# Patient Record
Sex: FEMALE | Race: WHITE | NOT HISPANIC OR LATINO | Employment: FULL TIME | URBAN - METROPOLITAN AREA
[De-identification: names, ages, dates, MRNs, and addresses within clinical notes are randomized per-mention and may not be internally consistent; named-entity substitution may affect disease eponyms.]

---

## 2021-05-26 ENCOUNTER — RECORDS - HEALTHEAST (OUTPATIENT)
Dept: ADMINISTRATIVE | Facility: CLINIC | Age: 31
End: 2021-05-26

## 2021-05-29 ENCOUNTER — RECORDS - HEALTHEAST (OUTPATIENT)
Dept: ADMINISTRATIVE | Facility: CLINIC | Age: 31
End: 2021-05-29

## 2021-05-31 ENCOUNTER — RECORDS - HEALTHEAST (OUTPATIENT)
Dept: ADMINISTRATIVE | Facility: CLINIC | Age: 31
End: 2021-05-31

## 2021-06-01 ENCOUNTER — RECORDS - HEALTHEAST (OUTPATIENT)
Dept: ADMINISTRATIVE | Facility: CLINIC | Age: 31
End: 2021-06-01

## 2021-06-02 ENCOUNTER — RECORDS - HEALTHEAST (OUTPATIENT)
Dept: ADMINISTRATIVE | Facility: CLINIC | Age: 31
End: 2021-06-02

## 2023-04-11 ENCOUNTER — APPOINTMENT (OUTPATIENT)
Dept: CT IMAGING | Facility: CLINIC | Age: 33
End: 2023-04-11
Attending: NURSE PRACTITIONER
Payer: COMMERCIAL

## 2023-04-11 ENCOUNTER — HOSPITAL ENCOUNTER (EMERGENCY)
Facility: CLINIC | Age: 33
Discharge: HOME | End: 2023-04-11
Admitting: NURSE PRACTITIONER
Payer: COMMERCIAL

## 2023-04-11 VITALS
OXYGEN SATURATION: 97 % | HEIGHT: 64 IN | BODY MASS INDEX: 27.31 KG/M2 | SYSTOLIC BLOOD PRESSURE: 136 MMHG | DIASTOLIC BLOOD PRESSURE: 93 MMHG | HEART RATE: 92 BPM | TEMPERATURE: 98.4 F | RESPIRATION RATE: 20 BRPM | WEIGHT: 160 LBS

## 2023-04-11 DIAGNOSIS — G44.209 TENSION HEADACHE: ICD-10-CM

## 2023-04-11 DIAGNOSIS — R10.12 ABDOMINAL PAIN, LEFT UPPER QUADRANT: ICD-10-CM

## 2023-04-11 LAB
ALBUMIN SERPL-MCNC: 3.3 G/DL (ref 3.5–5)
ALBUMIN UR-MCNC: NEGATIVE MG/DL
ALP SERPL-CCNC: 62 U/L (ref 45–120)
ALT SERPL W P-5'-P-CCNC: 13 U/L (ref 0–45)
ANION GAP SERPL CALCULATED.3IONS-SCNC: 8 MMOL/L (ref 5–18)
APPEARANCE UR: CLEAR
AST SERPL W P-5'-P-CCNC: 12 U/L (ref 0–40)
BASOPHILS # BLD AUTO: 0.1 10E3/UL (ref 0–0.2)
BASOPHILS NFR BLD AUTO: 1 %
BILIRUB DIRECT SERPL-MCNC: 0.2 MG/DL
BILIRUB SERPL-MCNC: 0.7 MG/DL (ref 0–1)
BILIRUB UR QL STRIP: NEGATIVE
BUN SERPL-MCNC: 7 MG/DL (ref 8–22)
CALCIUM SERPL-MCNC: 8.7 MG/DL (ref 8.5–10.5)
CHLORIDE BLD-SCNC: 105 MMOL/L (ref 98–107)
CO2 SERPL-SCNC: 24 MMOL/L (ref 22–31)
COLOR UR AUTO: ABNORMAL
CREAT SERPL-MCNC: 0.71 MG/DL (ref 0.6–1.1)
EOSINOPHIL # BLD AUTO: 0.1 10E3/UL (ref 0–0.7)
EOSINOPHIL NFR BLD AUTO: 1 %
ERYTHROCYTE [DISTWIDTH] IN BLOOD BY AUTOMATED COUNT: 11.3 % (ref 10–15)
GFR SERPL CREATININE-BSD FRML MDRD: >90 ML/MIN/1.73M2
GLUCOSE BLD-MCNC: 91 MG/DL (ref 70–125)
GLUCOSE UR STRIP-MCNC: NEGATIVE MG/DL
HCG SERPL QL: NEGATIVE
HCT VFR BLD AUTO: 37.8 % (ref 35–47)
HGB BLD-MCNC: 12.9 G/DL (ref 11.7–15.7)
HGB UR QL STRIP: NEGATIVE
IMM GRANULOCYTES # BLD: 0 10E3/UL
IMM GRANULOCYTES NFR BLD: 0 %
KETONES UR STRIP-MCNC: NEGATIVE MG/DL
LEUKOCYTE ESTERASE UR QL STRIP: NEGATIVE
LIPASE SERPL-CCNC: 10 U/L (ref 0–52)
LYMPHOCYTES # BLD AUTO: 1.6 10E3/UL (ref 0.8–5.3)
LYMPHOCYTES NFR BLD AUTO: 24 %
MCH RBC QN AUTO: 32.7 PG (ref 26.5–33)
MCHC RBC AUTO-ENTMCNC: 34.1 G/DL (ref 31.5–36.5)
MCV RBC AUTO: 96 FL (ref 78–100)
MONOCYTES # BLD AUTO: 0.7 10E3/UL (ref 0–1.3)
MONOCYTES NFR BLD AUTO: 10 %
MUCOUS THREADS #/AREA URNS LPF: PRESENT /LPF
NEUTROPHILS # BLD AUTO: 4.4 10E3/UL (ref 1.6–8.3)
NEUTROPHILS NFR BLD AUTO: 64 %
NITRATE UR QL: NEGATIVE
NRBC # BLD AUTO: 0 10E3/UL
NRBC BLD AUTO-RTO: 0 /100
PH UR STRIP: 6.5 [PH] (ref 5–7)
PLATELET # BLD AUTO: 414 10E3/UL (ref 150–450)
POTASSIUM BLD-SCNC: 3.8 MMOL/L (ref 3.5–5)
PROT SERPL-MCNC: 7 G/DL (ref 6–8)
RBC # BLD AUTO: 3.94 10E6/UL (ref 3.8–5.2)
RBC URINE: 0 /HPF
SODIUM SERPL-SCNC: 137 MMOL/L (ref 136–145)
SP GR UR STRIP: 1.01 (ref 1–1.03)
SQUAMOUS EPITHELIAL: 2 /HPF
UROBILINOGEN UR STRIP-MCNC: 2 MG/DL
WBC # BLD AUTO: 7 10E3/UL (ref 4–11)
WBC URINE: 1 /HPF

## 2023-04-11 PROCEDURE — 250N000011 HC RX IP 250 OP 636: Performed by: NURSE PRACTITIONER

## 2023-04-11 PROCEDURE — 80053 COMPREHEN METABOLIC PANEL: CPT | Performed by: EMERGENCY MEDICINE

## 2023-04-11 PROCEDURE — 83690 ASSAY OF LIPASE: CPT | Performed by: EMERGENCY MEDICINE

## 2023-04-11 PROCEDURE — 85004 AUTOMATED DIFF WBC COUNT: CPT | Performed by: EMERGENCY MEDICINE

## 2023-04-11 PROCEDURE — 81001 URINALYSIS AUTO W/SCOPE: CPT | Performed by: EMERGENCY MEDICINE

## 2023-04-11 PROCEDURE — 74177 CT ABD & PELVIS W/CONTRAST: CPT

## 2023-04-11 PROCEDURE — 82248 BILIRUBIN DIRECT: CPT | Performed by: EMERGENCY MEDICINE

## 2023-04-11 PROCEDURE — 36415 COLL VENOUS BLD VENIPUNCTURE: CPT | Performed by: EMERGENCY MEDICINE

## 2023-04-11 PROCEDURE — 84703 CHORIONIC GONADOTROPIN ASSAY: CPT | Performed by: EMERGENCY MEDICINE

## 2023-04-11 PROCEDURE — 99285 EMERGENCY DEPT VISIT HI MDM: CPT | Mod: 25

## 2023-04-11 RX ORDER — OXYCODONE HYDROCHLORIDE 5 MG/1
5 TABLET ORAL EVERY 6 HOURS PRN
Qty: 6 TABLET | Refills: 0 | Status: SHIPPED | OUTPATIENT
Start: 2023-04-11 | End: 2023-04-14

## 2023-04-11 RX ORDER — CYCLOBENZAPRINE HCL 10 MG
10 TABLET ORAL 3 TIMES DAILY PRN
Qty: 20 TABLET | Refills: 0 | Status: SHIPPED | OUTPATIENT
Start: 2023-04-11 | End: 2023-04-17

## 2023-04-11 RX ORDER — IOPAMIDOL 755 MG/ML
100 INJECTION, SOLUTION INTRAVASCULAR ONCE
Status: COMPLETED | OUTPATIENT
Start: 2023-04-11 | End: 2023-04-11

## 2023-04-11 RX ADMIN — IOPAMIDOL 100 ML: 755 INJECTION, SOLUTION INTRAVENOUS at 17:29

## 2023-04-11 ASSESSMENT — ENCOUNTER SYMPTOMS
CHILLS: 1
NAUSEA: 1
CONSTIPATION: 1
COUGH: 0
ABDOMINAL PAIN: 1
DIARRHEA: 0
FEVER: 1
SORE THROAT: 1
ROS GI COMMENTS: POSITIVE FOR ABDOMINAL BLOATING
HEADACHES: 1
VOMITING: 0

## 2023-04-11 NOTE — ED TRIAGE NOTES
Pt here with left abdominal pain that started suddenly while at work. Was seen at  a few weeks and dx with an ulcer which those symptoms have resolved. Also had some left shoulder pain today but that has since resolved as well.      Triage Assessment     Row Name 04/11/23 6856       Triage Assessment (Adult)    Airway WDL WDL       Respiratory WDL    Respiratory WDL WDL       Skin Circulation/Temperature WDL    Skin Circulation/Temperature WDL WDL       Cardiac WDL    Cardiac WDL WDL       Peripheral/Neurovascular WDL    Peripheral Neurovascular WDL WDL       Cognitive/Neuro/Behavioral WDL    Cognitive/Neuro/Behavioral WDL WDL

## 2023-04-11 NOTE — ED PROVIDER NOTES
EMERGENCY DEPARTMENT ENCOUNTER      NAME: Katharine Orellana  AGE: 32 year old female  YOB: 1990  MRN: [id number]  EVALUATION DATE & TIME: No admission date for patient encounter.    PCP: No Ref-Primary, Physician    ED PROVIDER: ELIECER Drummond CNP      Chief Complaint   Patient presents with     Abdominal Pain         FINAL IMPRESSION:  1. Abdominal pain, left upper quadrant    2. Tension headache          ED COURSE & MEDICAL DECISION MAKIN:04 PM I met with the patient, obtained history, performed an initial exam, and discussed options and plan for treatment here in the ED. PPE: procedure mask  6:25 PM updated with results.    Pertinent Labs & Imaging studies reviewed. (See chart for details)  32 year old female presents to the Emergency Department for evaluation of LUQ abdominal pain. Recently evaluated at the urgency room and felt to have stomach ulcers. Has been taking NSAIDs for tension headaches. Has finished a course of omeprazole and has noted increased bloating along with pain. Today, pain radiated to the left shoulder. Ddx include but not limited to ectopic pregnancy, kidney stone, diverticulitis, colitis, PUD, gastritis, pancreatitis, pneumonia. Labs today including lipase, CMP, CBC and pregnancy test essentially unremarkable. UA not consistent with UTI. CT abdomen and pelvis does not show any etiology for LUQ pain.  Atelectasis noted in the left lower lobe but this does not seem to be the etiology for her symptoms that we discussed this finding.  Plan is to resume omeprazole, 20 mg daily.  Discontinue any NSAIDs.  We will trial Flexeril for her tension headaches.  Recommend Tylenol for pain.  Does not currently have established primary care so given referral to primary care for ongoing evaluation.  Additionally, given return precautions      Medical Decision Making    History:    Supplemental history from: Documented in chart, if applicable    External Record(s) reviewed:  Documented in chart, if applicable.    Work Up:    Chart documentation includes differential considered and any EKGs or imaging independently interpreted by provider, where specified.    In additional to work up documented, I considered the following work up: Documented in chart, if applicable.    External consultation:    Discussion of management with another provider: Documented in chart, if applicable    Complicating factors:    Care impacted by chronic illness: Other: kideny stones, migraine headaches    Care affected by social determinants of health: N/A    Disposition considerations: Discharge. I prescribed additional prescription strength medication(s) as charted. See documentation for any additional details.        At the conclusion of the encounter I discussed the results of all of the tests and the disposition. The questions were answered. The patient or family acknowledged understanding and was agreeable with the care plan.       0 minutes of critical care time     MEDICATIONS GIVEN IN THE EMERGENCY:  Medications   iopamidol (ISOVUE-370) solution 100 mL (100 mLs Intravenous $Given 4/11/23 3475)       NEW PRESCRIPTIONS STARTED AT TODAY'S ER VISIT  New Prescriptions    CYCLOBENZAPRINE (FLEXERIL) 10 MG TABLET    Take 1 tablet (10 mg) by mouth 3 times daily as needed (tension headache)    OMEPRAZOLE (PRILOSEC) 20 MG DR CAPSULE    Take 1 capsule (20 mg) by mouth daily for 30 days    OXYCODONE (ROXICODONE) 5 MG TABLET    Take 1 tablet (5 mg) by mouth every 6 hours as needed for severe pain            =================================================================    Patient information was obtained from: Patient      Use of Intrepreter: N/A     Limitations to History: None     HPI    Katharine Orellana is a 32 year old female with a history of kidney stones, migraine headache, who presents to the ED by walk in for evaluation of left sided abdominal pain.     Chart Review:  3/13/2023 ([facility]  [place]): Patient seen in ED for evaluation of intermittent epigastric abdominal pain. Workup was benign other than a mildly elevated WBC of 11.2 and bilirubin of 2.1. No US evidence of acute processes of gallbladder. Patient discharged with prescriptions for pantoprazole and pepcid with instructions to follow up outpatient.     Patient reports left sided abdominal pain with referred pain in her left shoulder. Patient's pain worsens with deep breathing, laying down, and with eating/drinking. Patient reports recently being seen in urgent care for similar abdominal pain and states she was prescribed omeprazole and a medication for her stomach lining. Patient describes several recent episodes of illness/symptoms and is not sure if they are connected to each other. Prior to her visit to [facility], patient states she had cold symptoms with fever and chills. Patient was then seen and told she may have an ulcer. After, patient notes she developed an intractable headache and neck ache. Patient then developed abdominal bloating as well as a rash on her left forearm. She additionally notes current nausea, constipation, fatigue, sore throat, and generalized arthralgias. Patient describes her headache as a tension headache that feels as though there is a band around her forehead to the back of her head. Patient states she has taken multiple home COVID-19 tests with negative results. She has denies relief from GasX, Tums, excedrin, ibuprofen, or oxycodone (leftover from previous kidney stone surgery). She got mild relief form Aleeve. Patient reports a history of kidney stones x3 and states her current pain does not feel like kidney stones. She denies vomiting, diarrhea, cough, or any other complaints at this time.    SHx: Patient reports drinking 1 glass of wine/week      Review of Systems   Constitutional: Positive for chills (resolved), fever (resolved) and malaise/fatigue.   HENT: Positive for sore throat.   "  Respiratory: Negative for cough.    Gastrointestinal: Positive for abdominal pain (  left sided, referred pain to left shoulder), constipation and nausea. Negative for diarrhea and vomiting.        Positive for abdominal bloating   Musculoskeletal: Positive for joint pain (generalized and referred pain to left shoulder).   Skin: Positive for rash (left forearm).   Neurological: Positive for headaches (band like).       PAST MEDICAL HISTORY:  Past Medical History:   Diagnosis Date     Genital lesion, female 10/14/2015    Formatting of this note might be different from the original. Small lesion, appears to be genital wart     Kidney stones 6/25/2009    Formatting of this note might be different from the original. Has history of 15 stones left kidney and now 9 residual on the right Sees a urologist     LGSIL of cervix of undetermined significance 9/9/2014    Formatting of this note might be different from the original. 09/09/2014 LSIL 02/02/2015 [place] Focal squamous atypia suggestive of, but not definitively diagnostic for TYRONE I 09/08/2016 NIL/HPV+ 07/10/2019 NIL/HPV+  Plan: Colposcopy     Major depressive disorder, recurrent episode, moderate (H) 1/5/2011     Migraine headache 12/22/2009     Psoriasis 6/25/2009     Tobacco use disorder 12/22/2009       PAST SURGICAL HISTORY:  History reviewed. No pertinent surgical history.        CURRENT MEDICATIONS:    No current facility-administered medications for this encounter.     Current Outpatient Medications   Medication     cyclobenzaprine (FLEXERIL) 10 MG tablet     omeprazole (PRILOSEC) 20 MG DR capsule     oxyCODONE (ROXICODONE) 5 MG tablet         ALLERGIES:  No Known Allergies      VITALS:  Patient Vitals for the past 24 hrs:   BP Temp Pulse Resp SpO2 Height Weight   04/11/23 1606 (!) 149/110 98.4  F (36.9  C) 106 19 98 % 1.626 m (5' 4\") 72.6 kg (160 lb)       PHYSICAL EXAM    Constitutional:  alert, no distress  EYES: Conjunctivae clear  HENT:  Atraumatic, " normocephalic  Respiratory:  No respiratory distress, normal breath sounds  Cardiovascular:  Normal rate, normal rhythm, no murmurs  GI:  Soft, nondistended, minimal LUQ tenderness, no palpable masses, no rebound, no guarding   : No CVA tenderness.    Musculoskeletal:  No edema.  No cyanosis.  Integument: Warm, Dry, No erythema, No rash.   Neurologic:  Alert & oriented x 3, no focal deficits noted, ambulatory  Psych: Affect normal, Mood normal.              LAB:  All pertinent labs reviewed and interpreted.  Labs Ordered and Resulted from Time of ED Arrival to Time of ED Departure   BASIC METABOLIC PANEL - Abnormal       Result Value    Sodium 137      Potassium 3.8      Chloride 105      Carbon Dioxide (CO2) 24      Anion Gap 8      Urea Nitrogen 7 (*)     Creatinine 0.71      Calcium 8.7      Glucose 91      GFR Estimate >90     HEPATIC FUNCTION PANEL - Abnormal    Bilirubin Total 0.7      Bilirubin Direct 0.2      Protein Total 7.0      Albumin 3.3 (*)     Alkaline Phosphatase 62      AST 12      ALT 13     ROUTINE UA WITH MICROSCOPIC REFLEX TO CULTURE - Abnormal    Color Urine Light Yellow      Appearance Urine Clear      Glucose Urine Negative      Bilirubin Urine Negative      Ketones Urine Negative      Specific Gravity Urine 1.012      Blood Urine Negative      pH Urine 6.5      Protein Albumin Urine Negative      Urobilinogen Urine 2.0 (*)     Nitrite Urine Negative      Leukocyte Esterase Urine Negative      Mucus Urine Present (*)     RBC Urine 0      WBC Urine 1      Squamous Epithelials Urine 2 (*)    LIPASE - Normal    Lipase 10     HCG QUALITATIVE PREGNANCY - Normal    hCG Serum Qualitative Negative     CBC WITH PLATELETS AND DIFFERENTIAL    WBC Count 7.0      RBC Count 3.94      Hemoglobin 12.9      Hematocrit 37.8      MCV 96      MCH 32.7      MCHC 34.1      RDW 11.3      Platelet Count 414      % Neutrophils 64      % Lymphocytes 24      % Monocytes 10      % Eosinophils 1      % Basophils 1       % Immature Granulocytes 0      NRBCs per 100 WBC 0      Absolute Neutrophils 4.4      Absolute Lymphocytes 1.6      Absolute Monocytes 0.7      Absolute Eosinophils 0.1      Absolute Basophils 0.1      Absolute Immature Granulocytes 0.0      Absolute NRBCs 0.0           RADIOLOGY:  Reviewed all pertinent imaging. Please see official radiology report.  CT Abdomen Pelvis w Contrast   Final Result   IMPRESSION:    1.  A 2 mm nonobstructing stone in each kidney.   2.  Small amount of free fluid in the pelvic cul-de-sac and adjacent to the right adnexa likely relates to the benign physiologic 1.8 cm peripherally enhancing dominant right ovarian follicle. This requires no follow-up.   3.  Minimal atelectasis posterior basilar segment left lower lobe has developed.                PROCEDURES:   None      ELIECER Drummond, CNP  Emergency Services  [facility] EMERGENCY ROOM  [address] [place] [id number]  [phone]  Dept: [phone]           Adam Douglas APRN CNP  04/11/23 5064

## 2023-04-11 NOTE — DISCHARGE INSTRUCTIONS
WE BELIEVE GASTRITIS OR STOMAUCH ULCERS ARE THE CAUSE OF YOUR ABDOMINAL PAIN.  There is no test to confirm this diagnosis in the emergency department.  You will need to follow up in your primary care clinic for further evaluation.      TO CARE FOR YOURSELF AT HOME:   Take omeprazole as prescribed  Stop taking aleve, ibuprofen and other NSAIDS  For symptoms of abdominal pain, please use Maalox every 4 hours as needed to coat the lining of the stomach and help reduce your symptoms.      You may also take Tylenol every 6 hours as needed for the abdominal pain.   Schedule follow up in the clinic in as soon as possible for ongoing management.  For your tension headaches, take the muscle relaxer as prescribed.     _________________________________________________________________